# Patient Record
Sex: FEMALE | Race: WHITE | Employment: UNEMPLOYED | URBAN - METROPOLITAN AREA
[De-identification: names, ages, dates, MRNs, and addresses within clinical notes are randomized per-mention and may not be internally consistent; named-entity substitution may affect disease eponyms.]

---

## 2017-02-27 ENCOUNTER — OFFICE VISIT (OUTPATIENT)
Dept: FAMILY MEDICINE CLINIC | Age: 57
End: 2017-02-27

## 2017-02-27 VITALS
DIASTOLIC BLOOD PRESSURE: 81 MMHG | TEMPERATURE: 97.7 F | OXYGEN SATURATION: 100 % | HEIGHT: 62 IN | RESPIRATION RATE: 14 BRPM | HEART RATE: 61 BPM | SYSTOLIC BLOOD PRESSURE: 123 MMHG | BODY MASS INDEX: 17.3 KG/M2 | WEIGHT: 94 LBS

## 2017-02-27 DIAGNOSIS — H69.83 ETD (EUSTACHIAN TUBE DYSFUNCTION), BILATERAL: Primary | ICD-10-CM

## 2017-02-27 DIAGNOSIS — Z12.11 COLON CANCER SCREENING: ICD-10-CM

## 2017-02-27 RX ORDER — FLUTICASONE PROPIONATE 50 MCG
1 SPRAY, SUSPENSION (ML) NASAL DAILY
Qty: 1 BOTTLE | Refills: 0 | Status: SHIPPED | COMMUNITY
Start: 2017-02-27 | End: 2017-03-08 | Stop reason: ALTCHOICE

## 2017-02-27 RX ORDER — LORATADINE 10 MG/1
10 TABLET ORAL DAILY
Qty: 30 TAB | Refills: 11 | Status: SHIPPED | OUTPATIENT
Start: 2017-02-27 | End: 2019-05-30

## 2017-02-27 NOTE — PROGRESS NOTES
Discharge instructions reviewed with patient    Medication list and understanding of medications reviewed with patient. OTC and herbal medications reviewed and added to med list if applicable  Barriers to adherence assessed. Guidance given regarding new medications this visit, including reason for taking this medicine, and common side effects  Given FIT test with instructions. Thank you for returning your application for medication assistance. We will fax your application to the [facility] prescription , Vivia Cranker. It may take anywhere from 3 to 6 weeks for your application to be approved. THE FIRST FILL OF YOUR MEDICINE WILL BE DELIVERED TO THE [facility]. CALL [phone] OR [phone]   TO ASK FOR REFILLS WHEN YOU HAVE ONE MONTH   OF MEDICINE LEFT. Think of it the same way as when you call your regular pharmacy to order your medicine refills.   For [place]

## 2017-02-27 NOTE — PROGRESS NOTES
No chief complaint on file. 1. Have you been to the ER, urgent care clinic since your last visit? Hospitalized since your last visit? No    2. Have you seen or consulted any other health care providers outside of the [address] [place] [address] since your last visit? No    3. When was your last Mammogram, Pap smear, and/or Colon screening? Mammogram: Year: 2016  Pap smear: year: 2016 Colonoscopy: Year:No history of colon screening. PMH/FH/Social Hx reviewed and updated as needed      Applicable screenings reviewed and updated as needed  Medication reconciliation performed. Patient does not know need medication refills. Health Maintenance reviewed.

## 2017-02-27 NOTE — PROGRESS NOTES
GENESIS Jurado is a 49 y.o. female being seen today for   Chief Complaint   Patient presents with    Follow-up     right ear deafness- new symptoms left ear  - 2 days of pain followed by difficulty hearing   . she states that she continues to have trouble with right ear decreased hearing and pressure. Some on left side more recently. She has been taking the claritin most days. Still has some of the flonase sample she was given 6 months ago. Did try sudafed, but not with the clariitin. Has clear nasal drainage and sinus pressure. Also quit smoking but that did not help ears. Past Medical History:   Diagnosis Date    History of conization of cervix          ROS  Patient states that she is feeling well. Denies complaints of chest pain, shortness of breath, swelling of legs, dizziness or weakness. she denies nausea, vomiting or diarrhea. Current Outpatient Prescriptions   Medication Sig    fluticasone (FLONASE) 50 mcg/actuation nasal spray 1 North by Both Nostrils route daily.  loratadine (CLARITIN) 10 mg tablet Take 1 Tab by mouth daily.  fluticasone (FLONASE) 50 mcg/actuation nasal spray 1 North by Both Nostrils route daily. No current facility-administered medications for this visit. PE  Visit Vitals    /81 (BP 1 Location: Left arm, BP Patient Position: Sitting)    Pulse 61    Temp 97.7 °F (36.5 °C) (Oral)    Resp 14    Ht 5' 1.5\" (1.562 m)    Wt 94 lb (42.6 kg)    SpO2 100%    BMI 17.47 kg/m2        Alert and oriented with normal mood and affect. she is well developed and well nourished . Lungs are clear without wheezing. Heart rate is regular without murmurs or gallops. There is no lower extremity edema. Bilateral TMs retracted. No erythema. Nasal passages swollen with clear mucus. Assessment and Plan:        ICD-10-CM ICD-9-CM    1. ETD (eustachian tube dysfunction), bilateral H69.83 381.81 REFERRAL TO ENT-OTOLARYNGOLOGY   2.  Colon cancer screening  Reviewed options. Pt chooses FIT test Z12.11 V76.51 OCCULT BLOOD, IMMUNOASSAY (FIT)     Use flonase, claritin, sudafed consistently and all together. flonase sample given. Sx due to allergic rhiniits  Restart referral process for ENT. If she responds favorably to above treatment then she can choose to cancel her ENT appt.     Pap for rubio Atkins MD

## 2017-02-27 NOTE — PROGRESS NOTES
No chief complaint on file. 1. Have you been to the ER, urgent care clinic since your last visit? Hospitalized since your last visit? {Yes when where and reason for visit:20441}    2. Have you seen or consulted any other health care providers outside of the [address] [place] [address] since your last visit? {Yes when where and reason for visit:20441}    3. When was your last Mammogram, Pap smear, and/or Colon screening? Mammogram: Year: *** Pap smear: year: *** Colonoscopy: Year:***  PMH/FH/Social Hx reviewed and updated as needed      Applicable screenings reviewed and updated as needed  Medication reconciliation performed. Patient {DOES_DOES GMY:85176} need medication refills. Health Maintenance reviewed.

## 2017-02-27 NOTE — MR AVS SNAPSHOT
Visit Information Date & Time Provider Department Dept. Phone Encounter #  
 2/27/2017 10:15 AM Susana Bautista MD [address] [place] [address] [id number] Upcoming Health Maintenance Date Due Hepatitis C Screening 1960 Pneumococcal 19-64 Medium Risk (1 of 1 - PPSV23) 9/15/1981 DTaP/Tdap/Td series (1 - Tdap) 9/15/1983 FOBT Q 1 YEAR AGE 50-75 9/15/2012 INFLUENZA AGE 9 TO ADULT 8/1/2016 BREAST CANCER SCRN MAMMOGRAM 3/16/2018 PAP AKA CERVICAL CYTOLOGY 1/4/2019 Allergies as of 2/27/2017  Review Complete On: 2/27/2017 By: Ang Pichardo Severity Noted Reaction Type Reactions Ampicillin  04/11/2012    Nausea Only Iodine  04/25/2016    Not Reported This Time Shellfish Containing Products  05/23/2016    Other (comments) Current Immunizations  Never Reviewed No immunizations on file. Not reviewed this visit You Were Diagnosed With   
  
 Codes Comments ETD (eustachian tube dysfunction), bilateral    -  Primary ICD-10-CM: A26.74 ICD-9-CM: 381.81 Colon cancer screening     ICD-10-CM: Z12.11 ICD-9-CM: V76.51 Vitals BP  
  
  
  
  
  
 123/81 (BP 1 Location: Left arm, BP Patient Position: Sitting) BMI and BSA Data Body Mass Index Body Surface Area  
 17.47 kg/m 2 1.36 m 2 Preferred Pharmacy Pharmacy Name Phone [facility] [address] [place] [address] [phone] Your Updated Medication List  
  
   
This list is accurate as of: 2/27/17 11:13 AM.  Always use your most recent med list.  
  
  
  
  
 fluticasone 50 mcg/actuation nasal spray Commonly known as:  FLONASE  
1 [place] by Both Nostrils route daily. loratadine 10 mg tablet Commonly known as:  Karen Padilla Take 1 Tab by mouth daily. We Performed the Following REFERRAL TO ENT-OTOLARYNGOLOGY [AMX99 Custom] Comments: Please evaluate patient for ear pressure and decreased hearing EVMS To-Do List   
 02/27/2017 Lab:  OCCULT BLOOD, IMMUNOASSAY (FIT) Referral Information Referral ID Referred By Referred To [id number] Destiny Dyer MAEGAN Not Available Visits Status Start Date End Date 1 New Request 2/27/17 2/27/18 If your referral has a status of pending review or denied, additional information will be sent to support the outcome of this decision. Introducing [facility]! Neida Henry introduces [employer] patient portal. Now you can access parts of your medical record, email your doctor's office, and request medication refills online. 1. In your internet browser, go to [email or web address] 2. Click on the First Time User? Click Here link in the Sign In box. You will see the New Member Sign Up page. 3. Enter your [employer] Access Code exactly as it appears below. You will not need to use this code after youve completed the sign-up process. If you do not sign up before the expiration date, you must request a new code. · [employer] Access Code: [email or web address] Expires: 5/28/2017 11:13 AM 
 
4. Enter the last four digits of your Social Security Number (xxxx) and Date of Birth (mm/dd/yyyy) as indicated and click Submit. You will be taken to the next sign-up page. 5. Create a [employer] ID. This will be your [employer] login ID and cannot be changed, so think of one that is secure and easy to remember. 6. Create a [employer] password. You can change your password at any time. 7. Enter your Password Reset Question and Answer. This can be used at a later time if you forget your password. 8. Enter your e-mail address. You will receive e-mail notification when new information is available in [address] 9. Click Sign Up. You can now view and download portions of your medical record.  
10. Click the Download Summary menu link to download a portable copy of your medical information. If you have questions, please visit the Frequently Asked Questions section of the [employer] website. Remember, [employer] is NOT to be used for urgent needs. For medical emergencies, dial 911. Now available from your iPhone and Android! Please provide this summary of care documentation to your next provider. Your primary care clinician is listed as NONE. If you have any questions after today's visit, please call [phone].

## 2017-03-10 ENCOUNTER — HOSPITAL ENCOUNTER (OUTPATIENT)
Dept: LAB | Age: 57
Discharge: HOME | End: 2017-03-10

## 2017-03-10 DIAGNOSIS — Z12.11 COLON CANCER SCREENING: ICD-10-CM

## 2017-03-13 PROCEDURE — 82274 ASSAY TEST FOR BLOOD FECAL: CPT | Performed by: FAMILY MEDICINE

## 2017-03-15 LAB — HEMOCCULT STL QL IA: NEGATIVE

## 2017-09-25 ENCOUNTER — OFFICE VISIT (OUTPATIENT)
Dept: FAMILY MEDICINE CLINIC | Age: 57
End: 2017-09-25

## 2017-09-25 VITALS
TEMPERATURE: 98.2 F | HEART RATE: 90 BPM | HEIGHT: 62 IN | BODY MASS INDEX: 16.01 KG/M2 | DIASTOLIC BLOOD PRESSURE: 81 MMHG | RESPIRATION RATE: 16 BRPM | OXYGEN SATURATION: 94 % | SYSTOLIC BLOOD PRESSURE: 125 MMHG | WEIGHT: 87 LBS

## 2017-09-25 DIAGNOSIS — R10.13 EPIGASTRIC PAIN: Primary | ICD-10-CM

## 2017-09-25 DIAGNOSIS — F41.9 ANXIETY: ICD-10-CM

## 2017-09-25 RX ORDER — DIAZEPAM 5 MG/1
5 TABLET ORAL
Qty: 30 TAB | Refills: 1 | OUTPATIENT
Start: 2017-09-25 | End: 2019-05-30

## 2017-09-25 NOTE — PROGRESS NOTES
HPI  Jennifer Edwards is a 56 y.o. female being seen today for   Chief Complaint   Patient presents with    Follow-up   .  she states that she has upper abdominal pain every day for last 3 weeks. Antacids dont help. Bowel movements are normal.    Has vomited a few times but if she lays down she can keep from vomiting. Pain and nausea worse if she is moving around. This is the same pain she was seen for last year. Ultrasound ordered at that time but she never got it done. Now pain is worse. Also with new anxiety. Her 3 sons all in snf for drug charges. Financial troubles and just stressed about her family. Tearful and has a hard time making ti through the day. Smokes     Past Medical History:   Diagnosis Date    History of conization of cervix          ROS  Patient states that she is feeling well. Denies complaints of chest pain, shortness of breath, swelling of legs, dizziness or weakness. Current Outpatient Prescriptions   Medication Sig    diazePAM (VALIUM) 5 mg tablet Take 1 Tab by mouth every eight (8) hours as needed for Anxiety. Max Daily Amount: 15 mg.    beclomethasone (BECONASE AQ) 42 mcg (0.042 %) nasal spray 1 Puff by Both Nostrils route two (2) times a day.  loratadine (CLARITIN) 10 mg tablet Take 1 Tab by mouth daily. No current facility-administered medications for this visit. PE  Visit Vitals    /81 (BP 1 Location: Left arm, BP Patient Position: Sitting)    Pulse 90    Temp 98.2 °F (36.8 °C) (Oral)    Resp 16    Ht 5' 1.5\" (1.562 m)    Wt 87 lb (39.5 kg)    SpO2 94%    BMI 16.17 kg/m2        Alert and oriented with normal mood and affect. she is well developed and well nourished . Lungs are clear without wheezing. Heart rate is regular without murmurs or gallops. There is no lower extremity edema.   Some tenderness with deep palpation right upper quadrant    Results for orders placed or performed during the hospital encounter of 03/10/17   OCCULT BLOOD, IMMUNOASSAY (FIT)   Result Value Ref Range    Occult blood fecal, by IA NEGATIVE  NEGATIVE           Assessment and Plan:        ICD-10-CM ICD-9-CM    1. Epigastric pain R10.13 789.06 US GALLBLADDER   2. Anxiety F41.9 300.00      Check ultrasound for possible gallbladder disease  To the ED for any worsening    For anxiety will give short term rx for valium. Long term will transition to ssri but defer at this time due to nausea and acute abdominal issues.        Aydee Mitchell MD

## 2017-09-25 NOTE — PROGRESS NOTES
Discharge instructions reviewed with patient    Medication list and understanding of medications reviewed with patient. OTC and herbal medications reviewed and added to med list if applicable  Barriers to adherence assessed. Guidance given regarding new medications this visit, including reason for taking this medicine, and common side effects. Ultrasound scheduled for ABD US at [facility]. for tomorrow, (pt has to be NPO for 8 hours before test, and she ate breakfast.) Coupon for Valium given

## 2017-09-25 NOTE — MR AVS SNAPSHOT
Visit Information Date & Time Provider Department Dept. Phone Encounter #  
 9/25/2017 10:45 AM Bill Hernandez MD [facility] [address] [id number] Upcoming Health Maintenance Date Due Hepatitis C Screening 1960 DTaP/Tdap/Td series (1 - Tdap) 9/15/1983 INFLUENZA AGE 9 TO ADULT 8/1/2017 FOBT Q 1 YEAR AGE 50-75 3/13/2018 PAP AKA CERVICAL CYTOLOGY 1/4/2019 BREAST CANCER SCRN MAMMOGRAM 5/25/2019 Allergies as of 9/25/2017  Review Complete On: 9/25/2017 By: Agnieszka Vo Severity Noted Reaction Type Reactions Ampicillin  04/11/2012    Nausea Only Iodine  04/25/2016    Not Reported This Time Shellfish Containing Products  05/23/2016    Other (comments) Current Immunizations  Never Reviewed No immunizations on file. Not reviewed this visit You Were Diagnosed With   
  
 Codes Comments Epigastric pain    -  Primary ICD-10-CM: R10.13 ICD-9-CM: 789.06 Anxiety     ICD-10-CM: F41.9 ICD-9-CM: 300.00 Vitals BP Pulse Temp Resp Height(growth percentile) Weight(growth percentile) 125/81 (BP 1 Location: Left arm, BP Patient Position: Sitting) 90 98.2 °F (36.8 °C) (Oral) 16 5' 1.5\" (1.562 m) 87 lb (39.5 kg) SpO2 BMI OB Status Smoking Status 94% 16.17 kg/m2 Menopause Former Smoker Vitals History BMI and BSA Data Body Mass Index Body Surface Area  
 16.17 kg/m 2 1.31 m 2 Preferred Pharmacy Pharmacy Name Phone [facility] [address] [place] [address] [phone] Your Updated Medication List  
  
   
This list is accurate as of: 9/25/17 12:27 PM.  Always use your most recent med list.  
  
  
  
  
 beclomethasone 42 mcg (0.042 %) nasal spray Commonly known as:  BECONASE AQ  
1 Puff by Both Nostrils route two (2) times a day. diazePAM 5 mg tablet Commonly known as:  VALIUM  
 Take 1 Tab by mouth every eight (8) hours as needed for Anxiety. Max Daily Amount: 15 mg.  
  
 loratadine 10 mg tablet Commonly known as:  Satnam Pour Take 1 Tab by mouth daily. To-Do List   
 09/25/2017 Imaging:  US GALLBLADDER   
  
 09/26/2017 1:00 PM  
  Appointment with [address] at [facility] ([phone]) OUTSIDE FILMS  - Any outside films related to the study being scheduled should be brought with you on the day of the exam.  If this cannot be done there may be a delay in the reading of the study. NPO -Patient must be NPO (no food or drink) 8 hours prior to the exam.  MEDICATIONS  - Patient must bring a complete list of all medications currently taking to include prescriptions, over-the-counter meds, herbals, vitamins & any dietary supplements Introducing [facility]! [employer] introduces [employer] patient portal. Now you can access parts of your medical record, email your doctor's office, and request medication refills online. 1. In your internet browser, go to [email or web address] 2. Click on the First Time User? Click Here link in the Sign In box. You will see the New Member Sign Up page. 3. Enter your [employer] Access Code exactly as it appears below. You will not need to use this code after youve completed the sign-up process. If you do not sign up before the expiration date, you must request a new code. · [employer] Access Code: [email or web address] Expires: 12/12/2017  5:27 PM 
 
4. Enter the last four digits of your Social Security Number (xxxx) and Date of Birth (mm/dd/yyyy) as indicated and click Submit. You will be taken to the next sign-up page. 5. Create a [employer] ID. This will be your [employer] login ID and cannot be changed, so think of one that is secure and easy to remember. 6. Create a [employer] password. You can change your password at any time. 7. Enter your Password Reset Question and Answer.  This can be used at a later time if you forget your password. 8. Enter your e-mail address. You will receive e-mail notification when new information is available in [address] 9. Click Sign Up. You can now view and download portions of your medical record. 10. Click the Download Summary menu link to download a portable copy of your medical information. If you have questions, please visit the Frequently Asked Questions section of the [employer] website. Remember, [employer] is NOT to be used for urgent needs. For medical emergencies, dial 911. Now available from your iPhone and Android! Please provide this summary of care documentation to your next provider. Your primary care clinician is listed as [address]. If you have any questions after today's visit, please call [phone].

## 2017-09-26 ENCOUNTER — HOSPITAL ENCOUNTER (OUTPATIENT)
Dept: ULTRASOUND IMAGING | Age: 57
Discharge: HOME | End: 2017-09-26
Attending: FAMILY MEDICINE
Payer: SELF-PAY

## 2017-09-26 DIAGNOSIS — R10.13 EPIGASTRIC PAIN: ICD-10-CM

## 2017-09-26 PROCEDURE — 76705 ECHO EXAM OF ABDOMEN: CPT

## 2017-10-27 ENCOUNTER — TELEPHONE (OUTPATIENT)
Dept: FAMILY MEDICINE CLINIC | Age: 57
End: 2017-10-27

## 2017-10-27 NOTE — TELEPHONE ENCOUNTER
----- Message from Randall Devine MD sent at 9/28/2017  9:38 AM EDT -----  Can you call this nice pt and let her know her ultrasound was normal but she needs a follo wup appt with us to do more testing    Thank you  kvng  ----- Message -----     From: Regino Almendarez Results In     Sent: 9/27/2017   9:19 AM       To: Randall Devine MD        Appt scheduled.

## 2017-11-27 ENCOUNTER — HOSPITAL ENCOUNTER (OUTPATIENT)
Dept: LAB | Age: 57
Discharge: HOME | End: 2017-11-27

## 2017-11-27 ENCOUNTER — OFFICE VISIT (OUTPATIENT)
Dept: FAMILY MEDICINE CLINIC | Age: 57
End: 2017-11-27

## 2017-11-27 VITALS
OXYGEN SATURATION: 99 % | DIASTOLIC BLOOD PRESSURE: 80 MMHG | WEIGHT: 87 LBS | SYSTOLIC BLOOD PRESSURE: 118 MMHG | TEMPERATURE: 98.2 F | RESPIRATION RATE: 16 BRPM | HEART RATE: 80 BPM | BODY MASS INDEX: 16.01 KG/M2 | HEIGHT: 62 IN

## 2017-11-27 DIAGNOSIS — R10.13 EPIGASTRIC PAIN: ICD-10-CM

## 2017-11-27 DIAGNOSIS — Z23 ENCOUNTER FOR IMMUNIZATION: ICD-10-CM

## 2017-11-27 DIAGNOSIS — R10.13 EPIGASTRIC PAIN: Primary | ICD-10-CM

## 2017-11-27 DIAGNOSIS — F41.9 ANXIETY: ICD-10-CM

## 2017-11-27 LAB
ALBUMIN SERPL-MCNC: 3.8 G/DL (ref 3.4–5)
ALBUMIN/GLOB SERPL: 1.2 {RATIO} (ref 0.8–1.7)
ALP SERPL-CCNC: 71 U/L (ref 45–117)
ALT SERPL-CCNC: 27 U/L (ref 13–56)
ANION GAP SERPL CALC-SCNC: 6 MMOL/L (ref 3–18)
AST SERPL-CCNC: 20 U/L (ref 15–37)
BASOPHILS # BLD: 0 K/UL (ref 0–0.06)
BASOPHILS NFR BLD: 0 % (ref 0–2)
BILIRUB SERPL-MCNC: 0.3 MG/DL (ref 0.2–1)
BUN SERPL-MCNC: 10 MG/DL (ref 7–18)
BUN/CREAT SERPL: 16 (ref 12–20)
CALCIUM SERPL-MCNC: 9.3 MG/DL (ref 8.5–10.1)
CHLORIDE SERPL-SCNC: 107 MMOL/L (ref 100–108)
CO2 SERPL-SCNC: 28 MMOL/L (ref 21–32)
CREAT SERPL-MCNC: 0.63 MG/DL (ref 0.6–1.3)
DIFFERENTIAL METHOD BLD: NORMAL
EOSINOPHIL # BLD: 0.1 K/UL (ref 0–0.4)
EOSINOPHIL NFR BLD: 1 % (ref 0–5)
ERYTHROCYTE [DISTWIDTH] IN BLOOD BY AUTOMATED COUNT: 13 % (ref 11.6–14.5)
GLOBULIN SER CALC-MCNC: 3.2 G/DL (ref 2–4)
GLUCOSE SERPL-MCNC: 90 MG/DL (ref 74–99)
HCT VFR BLD AUTO: 42.8 % (ref 35–45)
HGB BLD-MCNC: 14.6 G/DL (ref 12–16)
LIPASE SERPL-CCNC: 577 U/L (ref 73–393)
LYMPHOCYTES # BLD: 3.2 K/UL (ref 0.9–3.6)
LYMPHOCYTES NFR BLD: 43 % (ref 21–52)
MCH RBC QN AUTO: 30.2 PG (ref 24–34)
MCHC RBC AUTO-ENTMCNC: 34.1 G/DL (ref 31–37)
MCV RBC AUTO: 88.6 FL (ref 74–97)
MONOCYTES # BLD: 0.5 K/UL (ref 0.05–1.2)
MONOCYTES NFR BLD: 7 % (ref 3–10)
NEUTS SEG # BLD: 3.6 K/UL (ref 1.8–8)
NEUTS SEG NFR BLD: 49 % (ref 40–73)
PLATELET # BLD AUTO: 225 K/UL (ref 135–420)
PMV BLD AUTO: 9.9 FL (ref 9.2–11.8)
POTASSIUM SERPL-SCNC: 4.1 MMOL/L (ref 3.5–5.5)
PROT SERPL-MCNC: 7 G/DL (ref 6.4–8.2)
RBC # BLD AUTO: 4.83 M/UL (ref 4.2–5.3)
SODIUM SERPL-SCNC: 141 MMOL/L (ref 136–145)
WBC # BLD AUTO: 7.4 K/UL (ref 4.6–13.2)

## 2017-11-27 PROCEDURE — 80053 COMPREHEN METABOLIC PANEL: CPT | Performed by: FAMILY MEDICINE

## 2017-11-27 PROCEDURE — 83690 ASSAY OF LIPASE: CPT | Performed by: FAMILY MEDICINE

## 2017-11-27 PROCEDURE — 85025 COMPLETE CBC W/AUTO DIFF WBC: CPT | Performed by: FAMILY MEDICINE

## 2017-11-27 PROCEDURE — 86677 HELICOBACTER PYLORI ANTIBODY: CPT | Performed by: FAMILY MEDICINE

## 2017-11-27 NOTE — PROGRESS NOTES
Renetta Holcomb is a 56 y.o. female who presents for routine immunizations. She denies any symptoms , reactions or allergies that would exclude them from being immunized today. Risks and adverse reactions were discussed and the VIS was given to them. All questions were addressed. She was observed for 15 min post injection. There were no reactions observed. Sun Godinez RN      Discharge instructions reviewed with patient    Medication list and understanding of medications reviewed with patient. OTC and herbal medications reviewed and added to med list if applicable  Barriers to adherence assessed. Guidance given regarding new medications this visit, including reason for taking this medicine, and common side effects.     Unable to print AVS.

## 2017-11-28 ENCOUNTER — HOSPITAL ENCOUNTER (OUTPATIENT)
Dept: LAB | Age: 57
Discharge: HOME | End: 2017-11-28
Payer: SELF-PAY

## 2017-11-28 DIAGNOSIS — R10.13 EPIGASTRIC PAIN: ICD-10-CM

## 2017-11-28 LAB
ATRIAL RATE: 89 BPM
CALCULATED P AXIS, ECG09: 83 DEGREES
CALCULATED R AXIS, ECG10: 94 DEGREES
CALCULATED T AXIS, ECG11: 77 DEGREES
DIAGNOSIS, 93000: NORMAL
P-R INTERVAL, ECG05: 154 MS
Q-T INTERVAL, ECG07: 328 MS
QRS DURATION, ECG06: 70 MS
QTC CALCULATION (BEZET), ECG08: 399 MS
VENTRICULAR RATE, ECG03: 89 BPM

## 2017-11-28 PROCEDURE — 93005 ELECTROCARDIOGRAM TRACING: CPT

## 2017-11-29 LAB — H PYLORI IGA SER-ACNC: 14.9 UNITS (ref 0–8.9)

## 2017-11-29 NOTE — PROGRESS NOTES
HPI  Zenaida Alcantar is a 56 y.o. female being seen today for   Chief Complaint   Patient presents with    Abdominal Pain     followup   .  she states that the abdominal pain comes and goes. Overall is the same. Worse with eating and she is losing weight. Feels her heart is pounding all the time. The diazepam is helping her anxiety. She uses it sparingly and still on her first bottle. Abdominal ultrasound showed some possible gallbladder sludge but no real cause for her abdominal pain    Past Medical History:   Diagnosis Date    History of conization of cervix          ROS  Patient states that she is feeling well. Denies complaints of chest pain, shortness of breath, swelling of legs, dizziness or weakness. she denies diarrhea. Current Outpatient Prescriptions   Medication Sig    diazePAM (VALIUM) 5 mg tablet Take 1 Tab by mouth every eight (8) hours as needed for Anxiety. Max Daily Amount: 15 mg.    beclomethasone (BECONASE AQ) 42 mcg (0.042 %) nasal spray 1 Puff by Both Nostrils route two (2) times a day.  loratadine (CLARITIN) 10 mg tablet Take 1 Tab by mouth daily. No current facility-administered medications for this visit. PE  Visit Vitals    /80 (BP 1 Location: Left arm, BP Patient Position: Sitting)    Pulse 80    Temp 98.2 °F (36.8 °C) (Oral)    Resp 16    Ht 5' 1.5\" (1.562 m)    Wt 87 lb (39.5 kg)    SpO2 99%    BMI 16.17 kg/m2        Alert and oriented with normal mood and affect. she is well developed and well nourished . Lungs are clear without wheezing. Heart rate is regular without murmurs or gallops. There is no lower extremity edema. Results for orders placed or performed during the hospital encounter of 03/10/17   OCCULT BLOOD, IMMUNOASSAY (FIT)   Result Value Ref Range    Occult blood fecal, by IA NEGATIVE  NEGATIVE           Assessment and Plan:        ICD-10-CM ICD-9-CM    1.  Epigastric pain R10.13 789.06 H PYLORI AB, IGA      LIPASE METABOLIC PANEL, COMPREHENSIVE      CBC WITH AUTOMATED DIFF      EKG, 12 LEAD, INITIAL   2. Anxiety F41.9 300.00    3.  Encounter for immunization Z23 V03.89 INFLUENZA VIRUS VAC QUAD,SPLIT,PRESV FREE SYRINGE IM     Check lipase and h pylori, EKG  Follow up pending labs      Marika Ignacio MD

## 2017-12-06 RX ORDER — AMOXICILLIN 500 MG/1
1000 CAPSULE ORAL 2 TIMES DAILY
Qty: 40 CAP | Refills: 0 | Status: SHIPPED | OUTPATIENT
Start: 2017-12-06 | End: 2017-12-16

## 2017-12-06 RX ORDER — PANTOPRAZOLE SODIUM 40 MG/1
40 TABLET, DELAYED RELEASE ORAL DAILY
Qty: 10 TAB | Refills: 0 | Status: SHIPPED | OUTPATIENT
Start: 2017-12-06 | End: 2017-12-16

## 2017-12-06 RX ORDER — LEVOFLOXACIN 500 MG/1
500 TABLET, FILM COATED ORAL DAILY
Qty: 10 TAB | Refills: 0 | Status: SHIPPED | OUTPATIENT
Start: 2017-12-06 | End: 2017-12-16

## 2018-06-28 ENCOUNTER — TELEPHONE (OUTPATIENT)
Dept: FAMILY MEDICINE CLINIC | Age: 58
End: 2018-06-28

## 2018-06-28 DIAGNOSIS — R42 DIZZINESS: Primary | ICD-10-CM

## 2018-06-28 DIAGNOSIS — H91.91 HEARING LOSS OF RIGHT EAR, UNSPECIFIED HEARING LOSS TYPE: ICD-10-CM

## 2018-06-28 NOTE — TELEPHONE ENCOUNTER
Spoke with patient about completing investigative tests suggested by [facility] were completed

## 2018-07-03 ENCOUNTER — HOSPITAL ENCOUNTER (OUTPATIENT)
Age: 58
Discharge: HOME | End: 2018-07-03
Attending: FAMILY MEDICINE
Payer: SELF-PAY

## 2018-07-03 DIAGNOSIS — H91.91 HEARING LOSS OF RIGHT EAR, UNSPECIFIED HEARING LOSS TYPE: ICD-10-CM

## 2018-07-03 DIAGNOSIS — R42 DIZZINESS: ICD-10-CM

## 2018-07-03 PROCEDURE — 74011250636 HC RX REV CODE- 250/636: Performed by: FAMILY MEDICINE

## 2018-07-03 PROCEDURE — 70553 MRI BRAIN STEM W/O & W/DYE: CPT

## 2018-07-03 PROCEDURE — A9575 INJ GADOTERATE MEGLUMI 0.1ML: HCPCS | Performed by: FAMILY MEDICINE

## 2018-07-03 RX ORDER — GADOTERATE MEGLUMINE 376.9 MG/ML
9 INJECTION INTRAVENOUS
Status: COMPLETED | OUTPATIENT
Start: 2018-07-03 | End: 2018-07-03

## 2018-07-03 RX ADMIN — GADOTERATE MEGLUMINE 9 ML: 376.9 INJECTION INTRAVENOUS at 11:45

## 2018-07-11 ENCOUNTER — TELEPHONE (OUTPATIENT)
Dept: FAMILY MEDICINE CLINIC | Age: 58
End: 2018-07-11

## 2019-05-30 ENCOUNTER — OFFICE VISIT (OUTPATIENT)
Dept: FAMILY MEDICINE CLINIC | Age: 59
End: 2019-05-30

## 2019-05-30 VITALS
DIASTOLIC BLOOD PRESSURE: 71 MMHG | OXYGEN SATURATION: 98 % | BODY MASS INDEX: 16.95 KG/M2 | WEIGHT: 89.8 LBS | HEART RATE: 77 BPM | SYSTOLIC BLOOD PRESSURE: 114 MMHG | RESPIRATION RATE: 20 BRPM | TEMPERATURE: 98.7 F | HEIGHT: 61 IN

## 2019-05-30 DIAGNOSIS — Z12.39 BREAST SCREENING, UNSPECIFIED: ICD-10-CM

## 2019-05-30 DIAGNOSIS — F41.9 ANXIETY: ICD-10-CM

## 2019-05-30 DIAGNOSIS — M06.9 RHEUMATOID ARTHRITIS INVOLVING BOTH HANDS, UNSPECIFIED RHEUMATOID FACTOR PRESENCE: Primary | ICD-10-CM

## 2019-05-30 RX ORDER — BUSPIRONE HYDROCHLORIDE 15 MG/1
7.5 TABLET ORAL 2 TIMES DAILY
Qty: 30 TAB | Refills: 3 | Status: SHIPPED | OUTPATIENT
Start: 2019-05-30 | End: 2020-08-21 | Stop reason: ALTCHOICE

## 2019-05-30 NOTE — PATIENT INSTRUCTIONS

## 2019-05-30 NOTE — PROGRESS NOTES
Patient is here for generalized pain and fatigue        1. Have you been to the ER, urgent care clinic since your last visit? Hospitalized since your last visit? No    2. Have you seen or consulted any other health care providers outside of the [address] [place] [address] since your last visit? Include any pap smears or colon screening.  CATIE

## 2019-05-30 NOTE — PROGRESS NOTES
HISTORY OF PRESENT ILLNESS  Christy Martinez is a 66 y.o. female. HPI  Patient is here today for evaluation and treatment of: Fatique, Generalized Pain,     Fatigue/Generalized pain - Pt is new to the practice. She was diagnosed with RA about 10 years go in [address]. She is on disability but she is  unsure what the formal diagnosis is  Has a chronic ringing in her ears. She has not prescribed meds for RA. She may take Ibuprofen at times . She has anxiety; Anxiety:  Has been managing this well. She is willing to try another non addicting med med anxiety. She has been on benzodiazepines in past.     She needs a mammogram      Current Outpatient Medications:     busPIRone (BUSPAR) 15 mg tablet, Take 0.5 Tabs by mouth two (2) times a day., Disp: 30 Tab, Rfl: 3       PMH,  Meds, Allergies, Family History, Social history reviewed      Review of Systems   Constitutional: Negative for chills and fever. Respiratory: Negative for shortness of breath and wheezing. Cardiovascular: Negative for chest pain and palpitations. Musculoskeletal: Positive for joint pain. Negative for falls. Physical Exam   Constitutional: She appears well-developed and well-nourished. No distress. HENT:   Right Ear: External ear normal.   Cardiovascular: Normal rate and regular rhythm. Exam reveals no gallop. No murmur heard. Pulmonary/Chest: Breath sounds normal. No respiratory distress. She has no wheezes. Visit Vitals  /71 (BP 1 Location: Left arm, BP Patient Position: Sitting)   Pulse 77   Temp 98.7 °F (37.1 °C) (Oral)   Resp 20   Ht 5' 1\" (1.549 m)   Wt 89 lb 12.8 oz (40.7 kg)   SpO2 98%   BMI 16.97 kg/m²         ASSESSMENT and PLAN    ICD-10-CM ICD-9-CM    1. Rheumatoid arthritis involving both hands, unspecified rheumatoid factor presence (HCC) M06.9 714.0 REFERRAL TO RHEUMATOLOGY   2. Anxiety F41.9 300.00 busPIRone (BUSPAR) 15 mg tablet   3.  Breast screening, unspecified Z12.31 V76.10 SUKH MAMMO BI SCREENING INCL CAD       As above   treatment plan as listed below  Orders Placed This Encounter    SUKH MAMMO BI SCREENING INCL CAD    REFERRAL TO RHEUMATOLOGY    busPIRone (BUSPAR) 15 mg tablet     Follow-up and Dispositions    · Return in about 8 weeks (around 7/25/2019) for RA; anxiety. An After Visit Summary was printed and given to the patient. This has been fully explained to the patient, who indicates understanding.

## 2019-08-27 ENCOUNTER — HOSPITAL ENCOUNTER (OUTPATIENT)
Dept: MAMMOGRAPHY | Age: 59
Discharge: HOME | End: 2019-08-27
Attending: FAMILY MEDICINE
Payer: MEDICAID

## 2019-08-27 DIAGNOSIS — Z12.39 BREAST SCREENING, UNSPECIFIED: ICD-10-CM

## 2019-08-27 PROCEDURE — 77067 SCR MAMMO BI INCL CAD: CPT

## 2019-09-25 ENCOUNTER — OFFICE VISIT (OUTPATIENT)
Dept: FAMILY MEDICINE CLINIC | Age: 59
End: 2019-09-25

## 2019-09-25 ENCOUNTER — TELEPHONE (OUTPATIENT)
Dept: FAMILY MEDICINE CLINIC | Age: 59
End: 2019-09-25

## 2019-09-25 VITALS
TEMPERATURE: 98.2 F | BODY MASS INDEX: 16.39 KG/M2 | DIASTOLIC BLOOD PRESSURE: 68 MMHG | HEART RATE: 85 BPM | WEIGHT: 86.8 LBS | RESPIRATION RATE: 20 BRPM | OXYGEN SATURATION: 97 % | HEIGHT: 61 IN | SYSTOLIC BLOOD PRESSURE: 110 MMHG

## 2019-09-25 DIAGNOSIS — R23.3 EASY BRUISING: ICD-10-CM

## 2019-09-25 DIAGNOSIS — R10.13 EPIGASTRIC PAIN: ICD-10-CM

## 2019-09-25 DIAGNOSIS — M67.431 GANGLION OF RIGHT WRIST: ICD-10-CM

## 2019-09-25 DIAGNOSIS — M06.9 RHEUMATOID ARTHRITIS INVOLVING BOTH HANDS, UNSPECIFIED RHEUMATOID FACTOR PRESENCE: Primary | ICD-10-CM

## 2019-09-25 DIAGNOSIS — K21.9 GASTROESOPHAGEAL REFLUX DISEASE WITHOUT ESOPHAGITIS: ICD-10-CM

## 2019-09-25 DIAGNOSIS — J20.8 ACUTE BRONCHITIS DUE TO OTHER SPECIFIED ORGANISMS: ICD-10-CM

## 2019-09-25 DIAGNOSIS — F41.9 ANXIETY: ICD-10-CM

## 2019-09-25 RX ORDER — AZITHROMYCIN 250 MG/1
TABLET, FILM COATED ORAL
Qty: 6 TAB | Refills: 0 | Status: SHIPPED | OUTPATIENT
Start: 2019-09-25 | End: 2020-08-21 | Stop reason: ALTCHOICE

## 2019-09-25 NOTE — PROGRESS NOTES
HISTORY OF PRESENT ILLNESS  Rosario Segura is a 64 y.o. female. HPI  Patient is here today for evaluation and treatment of: Anxiety/Productive Cough With Malodorous Sputum/ Skin Concerns:     Anxiety:  She was placed on buspar for anxiety at her last visit. Productive Cough With Malodorous Sputum-  She thinks she has bronchitis. Cough has been productive for a week. She smokes. Skin Concerns;  Developed a cyst on her right wrist.  Has had \" cysts \" before. Requests an exam  States her skin is bleeding easily with minimal injury pattern  States that she has midepigastric abdominal pain. She has had H. Pylori before. She had triple therapy . No pain with eating. She has sx that food gets stuck at times; May also happen with liquid. She had a GI work up 2 years ago for the same sx before the H. Pylori was diagnosed. Has a h/o RA. States her insurance states she can go see Dr. Anne Norwood. Wt Readings from Last 3 Encounters:   09/25/19 86 lb 12.8 oz (39.4 kg)   05/30/19 89 lb 12.8 oz (40.7 kg)   07/03/18 96 lb 6 oz (43.7 kg)           Current Outpatient Medications:     busPIRone (BUSPAR) 15 mg tablet, Take 0.5 Tabs by mouth two (2) times a day., Disp: 30 Tab, Rfl: 3      Review of Systems   Constitutional: Negative for chills and fever. Respiratory: Positive for sputum production. Negative for shortness of breath. Cardiovascular: Negative for chest pain and palpitations.        Physical Exam   Visit Vitals  /68 (BP 1 Location: Left arm, BP Patient Position: Sitting)   Pulse 85   Temp 98.2 °F (36.8 °C) (Oral)   Resp 20   Ht 5' 1\" (1.549 m)   Wt 86 lb 12.8 oz (39.4 kg)   SpO2 97%   BMI 16.40 kg/m²     General appearance: alert, cooperative, no distress; tearful but consolable, appears stated age  Neck: supple, symmetrical, trachea midline, no adenopathy, thyroid: not enlarged, symmetric, no tenderness/mass/nodules, no carotid bruit and no JVD  Lungs: clear to auscultation bilaterally  Heart: regular rate and rhythm, S1, S2 normal, no murmur, click, rub or gallop  Abdomen: soft,;  Bowel sounds normal. No masses,  no organomegaly; + TTP in midepigastrium  Right lateral wrist with a 1cm ganglion cystic lesion. Mobile; mildly tender. Skin;  Some mild bruising left dorsal wrist and right dorsal wrist.   Extremities: extremities normal, atraumatic, no cyanosis or edema  Skin: right dorsal foot with pinpoint papule; ? Wart ;  Similar smaller nodescript lesion on the left dorsal foot. ASSESSMENT and PLAN    ICD-10-CM ICD-9-CM    1. Rheumatoid arthritis involving both hands, unspecified rheumatoid factor presence (HCC) M06.9 714.0 REFERRAL TO RHEUMATOLOGY      METABOLIC PANEL, BASIC      CBC WITH AUTOMATED DIFF      TSH 3RD GENERATION      TSH 3RD GENERATION      CBC WITH AUTOMATED DIFF      METABOLIC PANEL, BASIC   2. Ganglion of right wrist- new M67.431 727.41    3. Easy bruising- new R23.8 782.9    4. Acute bronchitis due to other specified organisms- new J20.8 466.0    5. Gastroesophageal reflux disease without esophagitis- new K21.9 530.81 H PYLORI AB, IGM      H PYLORI AB, IGM   6. Epigastric pain- recurrent R10.13 789.06 H PYLORI AB, IGM      H PYLORI AB, IGM   7. Anxiety- fair but worse due to situational stressors F41.9 300.00        As above   treatment plan as listed below  Orders Placed This Encounter    METABOLIC PANEL, BASIC    CBC WITH AUTOMATED DIFF    TSH 3RD GENERATION    H PYLORI AB, IGM    REFERRAL TO RHEUMATOLOGY    azithromycin (ZITHROMAX Z-GREGORIA) 250 mg tablet     Follow-up and Dispositions    · Return in about 6 weeks (around 11/6/2019), or bronchitis/gerd. An After Visit Summary was printed and given to the patient.   Pt consolable; supported  Monitor

## 2019-09-25 NOTE — PROGRESS NOTES
Patient here for Anxiety/Productive Cough With Malodorous Sputum/skin problem    Follow up. 1. Have you been to the ER, urgent care clinic since your last visit? Hospitalized since your last visit? No    2. Have you seen or consulted any other health care providers outside of the [address] [place] [address] since your last visit? Include any pap smears or colon screening.  No

## 2019-09-25 NOTE — TELEPHONE ENCOUNTER
Pt is looking to follow up with a rheumatologist her care coordinator called Mary Mckeon stated her insurance will cover her to go see Dr. Leonor Lux or Bill Saldana please advise [phone] if any questions

## 2019-09-25 NOTE — PATIENT INSTRUCTIONS
Indigestion (Dyspepsia or Heartburn): Care Instructions  Your Care Instructions  Sometimes it can be hard to pinpoint the cause of indigestion. (It is also called dyspepsia or heartburn.) Most cases of an upset stomach with bloating, burning, burping, and nausea are minor and go away within several hours. Home treatment and over-the-counter medicine often are able to control symptoms. But if you take medicine to relieve your indigestion without making diet and lifestyle changes, your symptoms are likely to return again and again. If you get indigestion often, it may be a sign of a more serious medical problem. Be sure to follow up with your doctor, who may want to do tests to be sure of the cause of your indigestion. Follow-up care is a key part of your treatment and safety. Be sure to make and go to all appointments, and call your doctor if you are having problems. It's also a good idea to know your test results and keep a list of the medicines you take. How can you care for yourself at home? · Your doctor may recommend over-the-counter medicine. For mild or occasional indigestion, antacids such as Gaviscon, Mylanta, Maalox, or Tums, may help. Be safe with medicines. Be careful when you take over-the-counter antacid medicines. Many of these medicines have aspirin in them. Read the label to make sure that you are not taking more than the recommended dose. Too much aspirin can be harmful. · Your doctor also may recommend over-the-counter acid reducers, such as Pepcid AC, Tagamet HB, Zantac 75, or Prilosec. Read and follow all instructions on the label. If you use these medicines often, talk with your doctor. · Change your eating habits. ? It's best to eat several small meals instead of two or three large meals. ? After you eat, wait 2 to 3 hours before you lie down. ? Chocolate, mint, and alcohol can make GERD worse. ?  Spicy foods, foods that have a lot of acid (like tomatoes and oranges), and coffee can make GERD symptoms worse in some people. If your symptoms are worse after you eat a certain food, you may want to stop eating that food to see if your symptoms get better. · Do not smoke or chew tobacco. Smoking can make GERD worse. If you need help quitting, talk to your doctor about stop-smoking programs and medicines. These can increase your chances of quitting for good. · If you have GERD symptoms at night, raise the head of your bed 6 to 8 inches. You can do this by putting the frame on blocks or placing a foam wedge under the head of your mattress. (Adding extra pillows does not work.)  · Do not wear tight clothing around your middle. · Lose weight if you need to. Losing just 5 to 10 pounds can help. · Do not take anti-inflammatory medicines, such as aspirin, ibuprofen (Advil, Motrin), or naproxen (Aleve). These can irritate the stomach. If you need a pain medicine, try acetaminophen (Tylenol), which does not cause stomach upset. When should you call for help? Call your doctor now or seek immediate medical care if:    · You have new or worse belly pain.     · You are vomiting.    Watch closely for changes in your health, and be sure to contact your doctor if:    · You have new or worse symptoms of indigestion.     · You have trouble or pain swallowing.     · You are losing weight.     · You do not get better as expected. Where can you learn more? Go to http://madisyn-jennyfer.info/. Enter J552 in the search box to learn more about \"Indigestion (Dyspepsia or Heartburn): Care Instructions. \"  Current as of: November 7, 2018  Content Version: 12.2  © 7916-3323 Healthwise, Incorporated. Care instructions adapted under license by [employer] (which disclaims liability or warranty for this information).  If you have questions about a medical condition or this instruction, always ask your healthcare professional. [address] any warranty or liability for your use of this information.

## 2019-09-26 LAB
ABSOLUTE LYMPHOCYTE COUNT, 10803: 2 K/UL (ref 1–4.8)
ANION GAP SERPL CALC-SCNC: 16 MMOL/L
BASOPHILS # BLD: 0 K/UL (ref 0–0.2)
BASOPHILS NFR BLD: 1 % (ref 0–2)
BUN SERPL-MCNC: 12 MG/DL (ref 6–22)
CALCIUM SERPL-MCNC: 9.3 MG/DL (ref 8.4–10.5)
CHLORIDE SERPL-SCNC: 102 MMOL/L (ref 98–110)
CO2 SERPL-SCNC: 24 MMOL/L (ref 20–32)
CREAT SERPL-MCNC: 0.6 MG/DL (ref 0.5–1.2)
EOSINOPHIL # BLD: 0.1 K/UL (ref 0–0.5)
EOSINOPHIL NFR BLD: 1 % (ref 0–6)
ERYTHROCYTE [DISTWIDTH] IN BLOOD BY AUTOMATED COUNT: 13.3 % (ref 10–15.5)
GFRAA, 66117: >60
GFRNA, 66118: >60
GLUCOSE SERPL-MCNC: 81 MG/DL (ref 70–99)
GRANULOCYTES,GRANS: 55 % (ref 40–75)
HCT VFR BLD AUTO: 45.1 % (ref 35.1–48)
HGB BLD-MCNC: 14.8 G/DL (ref 11.7–16)
LYMPHOCYTES, LYMLT: 35 % (ref 20–45)
MCH RBC QN AUTO: 30 PG (ref 26–34)
MCHC RBC AUTO-ENTMCNC: 33 G/DL (ref 31–36)
MCV RBC AUTO: 92 FL (ref 80–95)
MONOCYTES # BLD: 0.5 K/UL (ref 0.1–1)
MONOCYTES NFR BLD: 8 % (ref 3–12)
NEUTROPHILS # BLD AUTO: 3.2 K/UL (ref 1.8–7.7)
PLATELET # BLD AUTO: 184 K/UL (ref 140–440)
PMV BLD AUTO: 9.8 FL (ref 9–13)
POTASSIUM SERPL-SCNC: 4 MMOL/L (ref 3.5–5.5)
RBC # BLD AUTO: 4.92 M/UL (ref 3.8–5.2)
SODIUM SERPL-SCNC: 142 MMOL/L (ref 133–145)
TSH SERPL DL<=0.005 MIU/L-ACNC: 2.13 MCU/ML (ref 0.27–4.2)
WBC # BLD AUTO: 5.8 K/UL (ref 4–11)

## 2019-09-27 LAB — H PYLORI IGM SER-ACNC: 23.1 UNITS (ref 0–8.9)

## 2019-10-01 NOTE — PROGRESS NOTES
Letter sent from [facility].

## 2019-10-09 ENCOUNTER — TELEPHONE (OUTPATIENT)
Dept: FAMILY MEDICINE CLINIC | Age: 59
End: 2019-10-09

## 2019-10-30 ENCOUNTER — OFFICE VISIT (OUTPATIENT)
Dept: FAMILY MEDICINE CLINIC | Age: 59
End: 2019-10-30

## 2019-10-30 VITALS
SYSTOLIC BLOOD PRESSURE: 122 MMHG | TEMPERATURE: 98.2 F | HEART RATE: 69 BPM | DIASTOLIC BLOOD PRESSURE: 80 MMHG | OXYGEN SATURATION: 99 % | BODY MASS INDEX: 16.39 KG/M2 | WEIGHT: 86.8 LBS | HEIGHT: 61 IN | RESPIRATION RATE: 18 BRPM

## 2019-10-30 DIAGNOSIS — R10.13 EPIGASTRIC PAIN: ICD-10-CM

## 2019-10-30 DIAGNOSIS — A04.8 H. PYLORI INFECTION: Primary | ICD-10-CM

## 2019-10-30 RX ORDER — LANSOPRAZOLE 30 MG/1
30 CAPSULE, DELAYED RELEASE ORAL 2 TIMES DAILY
Qty: 28 CAP | Refills: 0 | Status: SHIPPED | OUTPATIENT
Start: 2019-10-30 | End: 2020-08-21 | Stop reason: ALTCHOICE

## 2019-10-30 RX ORDER — TETRACYCLINE HYDROCHLORIDE 500 MG/1
500 CAPSULE ORAL
Qty: 56 CAP | Refills: 0 | Status: SHIPPED | OUTPATIENT
Start: 2019-10-30 | End: 2019-11-13

## 2019-10-30 RX ORDER — METRONIDAZOLE 250 MG/1
250 TABLET ORAL 3 TIMES DAILY
Qty: 42 TAB | Refills: 0 | Status: SHIPPED | OUTPATIENT
Start: 2019-10-30 | End: 2019-11-13

## 2019-10-30 NOTE — PROGRESS NOTES
HISTORY OF PRESENT ILLNESS  Shayy Lucio is a 64 y.o. female. HPI  Patient is here today for evaluation and treatment of: Patient is here to review lab results for H pylori  Pt has had H. Pylori infection before and was treated. She has since developed midepigastric pain and had testing for H. Pylori once again. Her H. Pylori lab is + and she is here to discuss treatment options. She still continues to have midepigastric pain. She is underweight. Wt Readings from Last 3 Encounters:   10/30/19 86 lb 12.8 oz (39.4 kg)   09/25/19 86 lb 12.8 oz (39.4 kg)   05/30/19 89 lb 12.8 oz (40.7 kg)      She has continued situational stressors. She continues to take buspar; med does help. Current Outpatient Medications:     busPIRone (BUSPAR) 15 mg tablet, Take 0.5 Tabs by mouth two (2) times a day., Disp: 30 Tab, Rfl: 3    azithromycin (ZITHROMAX Z-GREGORIA) 250 mg tablet, Take 2 tabs PO on Day #1 and then 1 tab PO on Days #2-5, Disp: 6 Tab, Rfl: 0    Review of Systems   Constitutional: Negative for chills and fever. Cardiovascular: Negative for leg swelling. Gastrointestinal: Negative for diarrhea and vomiting. Psychiatric/Behavioral: Negative for suicidal ideas. The patient is nervous/anxious. Physical Exam   Constitutional: She appears well-developed. No distress. thin   Cardiovascular: Normal rate and regular rhythm. Exam reveals no gallop and no friction rub. No murmur heard. Pulmonary/Chest: Breath sounds normal. She has no wheezes. She has no rales. Abdominal: Bowel sounds are normal. She exhibits no distension and no mass. There is tenderness (in mid epigastrium). There is no rebound and no guarding. Musculoskeletal: She exhibits no edema. Nursing note and vitals reviewed.      Visit Vitals  /80 (BP 1 Location: Left arm, BP Patient Position: Sitting)   Pulse 69   Temp 98.2 °F (36.8 °C) (Oral)   Resp 18   Ht 5' 1\" (1.549 m)   Wt 86 lb 12.8 oz (39.4 kg)   SpO2 99%   BMI 16.40 kg/m² Tearful, consolable    H. Pylori result noted. Lab Results   Component Value Date/Time    Sodium 142 09/25/2019 12:35 PM    Potassium 4.0 09/25/2019 12:35 PM    Chloride 102 09/25/2019 12:35 PM    CO2 24 09/25/2019 12:35 PM    Anion gap 16.0 09/25/2019 12:35 PM    Glucose 81 09/25/2019 12:35 PM    BUN 12 09/25/2019 12:35 PM    Creatinine 0.6 09/25/2019 12:35 PM    BUN/Creatinine ratio 16 11/27/2017 10:59 AM    GFR est AA >60 11/27/2017 10:59 AM    GFR est non-AA >60 11/27/2017 10:59 AM    Calcium 9.3 09/25/2019 12:35 PM     Lab Results   Component Value Date/Time    WBC 5.8 09/25/2019 12:35 PM    HGB 14.8 09/25/2019 12:35 PM    HCT 45.1 09/25/2019 12:35 PM    PLATELET 443 19/89/0022 12:35 PM    MCV 92 09/25/2019 12:35 PM         ASSESSMENT and PLAN    ICD-10-CM ICD-9-CM    1. H. pylori infection A04.8 041.86 REFERRAL TO GASTROENTEROLOGY   2. Epigastric pain R10.13 789.06 REFERRAL TO GASTROENTEROLOGY       As above, new   treatment plan as listed below  Orders Placed This Encounter    REFERRAL TO GASTROENTEROLOGY    tetracycline (ACHROMYCIN, SUMYCIN) 500 mg capsule    metroNIDAZOLE (FLAGYL) 250 mg tablet    lansoprazole (PREVACID) 30 mg capsule    bismuth subsalicylate (PEPTO-BISMOL) 262 mg tab     Quad therapy as above  GI referral  Pt supported  Follow-up and Dispositions    · Return in about 3 months (around 1/30/2020). An After Visit Summary was printed and given to the patient. This has been fully explained to the patient, who indicates understanding.

## 2019-10-30 NOTE — PATIENT INSTRUCTIONS
H. Pylori Bacterial Infection in Children: Care Instructions  Your Care Instructions    Your child's test shows the presence of Helicobacter pylori ( H. pylori), a kind of bacterium that lives in the lining of the stomach. Many people have H. pylori in their stomachs and do not develop problems. But sometimes H. pylori causes an upset stomach or a sore (ulcer) in the stomach lining. Most stomach ulcers are caused by H. pylori. Symptoms of an ulcer include gnawing or burning pain in the belly that can last minutes or hours. Eating food or taking antacids helps relieve the pain, but the symptoms may come back after a while. Antibiotic medicine can cure an H. pylori infection. Follow-up care is a key part of your child's treatment and safety. Be sure to make and go to all appointments, and call your doctor if your child is having problems. It's also a good idea to know your child's test results and keep a list of the medicines your child takes. How can you care for your child at home? · If the doctor prescribed antibiotics for your child, give them as directed. Do not stop using them just because your child feels better. Your child needs to take the full course of antibiotics. · If your doctor prescribes other medicine, have your child take it exactly as prescribed. Call your doctor if you think your child is having a problem with his or her medicine. You will get more details on the specific medicine your doctor prescribes. · Help your child eat a healthy, balanced diet. ? Serve smaller meals, and eat more often. Be sure your child eats at least three meals a day. ? Avoid heavily spiced or greasy foods. ? Avoid cola drinks, chocolate, and other foods with caffeine, which may cause an ulcer to hurt more. · Keep your child away from smoke. Do not smoke or let anyone else smoke around your child or in your house. Smoke slows the healing of your child's ulcer and can make an ulcer come back.   · Make sure your child washes his or her hands after going to the bathroom. · Do not give your child ibuprofen, aspirin, or other anti-inflammatory medicines, because they can irritate the stomach. If your child needs pain medicine, try acetaminophen (Tylenol). · Do not give your child two or more pain medicines at the same time unless the doctor told you to. Many pain medicines have acetaminophen, which is Tylenol. Too much acetaminophen (Tylenol) can be harmful. When should you call for help? Call 911 anytime you think your child may need emergency care. For example, call if:    · Your child's stools are maroon or very bloody.     · Your child vomits blood or what looks like coffee grounds.    Call your doctor now or seek immediate medical care if:    · Your child has new or worse belly pain.     · Your child's stools are black and look like tar, or they have streaks of blood.     · Your child is vomiting.    Watch closely for changes in your child's health, and be sure to contact your doctor if:    · Your child does not get better as expected. Where can you learn more? Go to http://madisyn-jennyfer.info/. Enter C401 in the search box to learn more about \"H. Pylori Bacterial Infection in Children: Care Instructions. \"  Current as of: November 7, 2018  Content Version: 12.2  © 8729-5858 Healthwise, Incorporated. Care instructions adapted under license by [employer] (which disclaims liability or warranty for this information). If you have questions about a medical condition or this instruction, always ask your healthcare professional. [place] [address] any warranty or liability for your use of this information.

## 2019-10-30 NOTE — PROGRESS NOTES
Patient here for lab results review. 1. Have you been to the ER, urgent care clinic since your last visit? Hospitalized since your last visit? No    2. Have you seen or consulted any other health care providers outside of the [address] [place] [address] since your last visit? Include any pap smears or colon screening.  No

## 2019-11-12 ENCOUNTER — TELEPHONE (OUTPATIENT)
Dept: FAMILY MEDICINE CLINIC | Age: 59
End: 2019-11-12

## 2019-11-12 NOTE — TELEPHONE ENCOUNTER
Spoke to patient. Dr Estephanie Dunne office attempted to contact patient and schedule her appointment. She did not call the office back. She was given their number to call and set up her appointment. Patient verbalized understanding.

## 2019-11-12 NOTE — TELEPHONE ENCOUNTER
Patient states she received a letter from her insurance that they did no cover her Prevacid and needed to know if she had tried and failed 2 of the following  Omprazole, Protonix. Lomersoporazole. The patient states that she thinks she has been on other medications for her stomach. She is in a lot of pain and she states she is supposed to take these medication together.  Please advise [phone]

## 2019-11-14 ENCOUNTER — TELEPHONE (OUTPATIENT)
Dept: FAMILY MEDICINE CLINIC | Age: 59
End: 2019-11-14

## 2019-11-14 NOTE — TELEPHONE ENCOUNTER
Pt requesting a return call re: exposure to ESBL.  She said this is related to bacteria and ECOLI    She was helping clean someone up recently, took him to the ED and he were diagnosed with ESBL    Please assist

## 2019-11-15 NOTE — TELEPHONE ENCOUNTER
Spoke with patient to gather information. Patient stated that a family member needed assistance and was staying in the patient's home for 3 weeks. Patient stated that patient took family member to the emergency room and family member has been admitted to the hospital. Patient stated that family has been diagnosed with ESBL and that patient's body fluids had been dripping on several surfaces in patient's home. Patient is concerned about contamination at home and spread of ESBL to the patient/patient's family. Informed to contact the nurse at the hospital and request to speak with attending physician for questions about risk to patient, patient's family, and cleaning home properly as to not spread disease. Patient verbalized understanding.

## 2019-11-15 NOTE — TELEPHONE ENCOUNTER
Informed patient that Dr. Dangelo Alcala is aware of possible medication change. In speaking with patient, the patient had not taken the antibiotics. Informed patient to take prescribed tetracycline, metronidazole, and pepto-bimol along with over the counter prevacid. Patient verbalized understanding.

## 2020-08-21 ENCOUNTER — VIRTUAL VISIT (OUTPATIENT)
Dept: FAMILY MEDICINE CLINIC | Age: 60
End: 2020-08-21

## 2020-08-21 DIAGNOSIS — I24.9 ACUTE CORONARY SYNDROME (HCC): Primary | ICD-10-CM

## 2020-08-21 DIAGNOSIS — Z09 HOSPITAL DISCHARGE FOLLOW-UP: ICD-10-CM

## 2020-08-21 RX ORDER — GUAIFENESIN 100 MG/5ML
81 LIQUID (ML) ORAL DAILY
COMMUNITY
Start: 2020-08-14

## 2020-08-21 RX ORDER — FLUTICASONE PROPIONATE 50 MCG
1 SPRAY, SUSPENSION (ML) NASAL DAILY PRN
COMMUNITY

## 2020-08-21 RX ORDER — METOPROLOL TARTRATE 25 MG/1
12.5 TABLET, FILM COATED ORAL 2 TIMES DAILY
COMMUNITY
Start: 2020-08-13

## 2020-08-21 RX ORDER — ATORVASTATIN CALCIUM 80 MG/1
80 TABLET, FILM COATED ORAL
COMMUNITY
Start: 2020-08-13

## 2020-08-21 RX ORDER — CLOPIDOGREL BISULFATE 75 MG/1
75 TABLET ORAL DAILY
COMMUNITY
Start: 2020-08-14

## 2020-08-21 NOTE — PROGRESS NOTES
Cheo Escalona is a 64 y.o. female who was seen by synchronous (real-time) audio-video technology on 8/21/2020 for Hospital Follow Up and [facility] Follow Up:    Cheo Escalona is seen for follow up from recent admission to [facility] on August 9, 2020. I reviewed the lab results, the notes, the records. She presented with Chest Pain/Back Pain (ACS)(NSTEMI). She is taking her medication as directed & without any side effects. She reports symptoms are significantly improved. She has a follow-up with Cardiology on September 15, 2020. Assessment & Plan:   Diagnoses and all orders for this visit:    1. Acute coronary syndrome ([facility] [address]    2. Hospital discharge follow-up      Follow-up and Dispositions    · Return if symptoms worsen or fail to improve. 712  Subjective:       Prior to Admission medications    Medication Sig Start Date End Date Taking? Authorizing Provider   aspirin 81 mg chewable tablet Take 81 mg by mouth daily. 8/14/20  Yes Provider, Historical   atorvastatin (LIPITOR) 80 mg tablet Take 80 mg by mouth nightly. 8/13/20  Yes Provider, Historical   clopidogreL (PLAVIX) 75 mg tab Take 75 mg by mouth daily. 8/14/20  Yes Provider, Historical   fluticasone propionate (FLONASE) 50 mcg/actuation nasal spray 1 Spray daily as needed. Yes Provider, Historical   metoprolol tartrate (LOPRESSOR) 25 mg tablet Take 12.5 mg by mouth two (2) times a day. 8/13/20  Yes Provider, Historical   lansoprazole (PREVACID) 30 mg capsule Take 1 Cap by mouth two (2) times a day. 10/30/19 8/21/20  Roge Alcantar MD   bismuth subsalicylate (PEPTO-BISMOL) 262 mg tab Take 1 Tab by mouth four (4) times daily. 10/30/19 8/21/20  Roge Alcantar MD   azithromycin (ZITHROMAX Z-GREGORIA) 250 mg tablet Take 2 tabs PO on Day #1 and then 1 tab PO on Days #2-5 9/25/19 8/21/20  Roge Alcantar MD   busPIRone (BUSPAR) 15 mg tablet Take 0.5 Tabs by mouth two (2) times a day.  5/30/19 8/21/20 Honorio Buckley MD     Patient Active Problem List   Diagnosis Code    ETD (eustachian tube dysfunction) H69.80    Epigastric pain R10.13    Smoker F17.200    Hypercholesterolemia E78.00    Abnormal Pap smear of cervix R87.619     Current Outpatient Medications   Medication Sig Dispense Refill    aspirin 81 mg chewable tablet Take 81 mg by mouth daily.  atorvastatin (LIPITOR) 80 mg tablet Take 80 mg by mouth nightly.  clopidogreL (PLAVIX) 75 mg tab Take 75 mg by mouth daily.  fluticasone propionate (FLONASE) 50 mcg/actuation nasal spray 1 Spray daily as needed.  metoprolol tartrate (LOPRESSOR) 25 mg tablet Take 12.5 mg by mouth two (2) times a day. Allergies   Allergen Reactions    Ampicillin Nausea Only    Iodine Not Reported This Time    Shellfish Containing Products Other (comments)     Past Medical History:   Diagnosis Date    Abnormal Pap smear of cervix     Anxiety     History of conization of cervix     Hypercholesterolemia        Review of Systems   Respiratory: Negative for shortness of breath. Cardiovascular: Negative for chest pain and palpitations. Objective:   No flowsheet data found. General: alert, cooperative, no distress   Mental  status: normal mood, behavior, speech, dress, motor activity, and thought processes, able to follow commands   HENT: NCAT   Neck: no visualized mass   Resp: no respiratory distress   Neuro: no gross deficits   Skin: no discoloration or lesions of concern on visible areas   Psychiatric: normal affect, consistent with stated mood, no evidence of hallucinations     Additional exam findings: N/A      We discussed the expected course, resolution and complications of the diagnosis(es) in detail. Medication risks, benefits, costs, interactions, and alternatives were discussed as indicated. I advised her to contact the office if her condition worsens, changes or fails to improve as anticipated.  She expressed understanding with the diagnosis(es) and plan. Nicholas Reese, who was evaluated through a patient-initiated, synchronous (real-time) audio-video encounter, and/or her healthcare decision maker, is aware that it is a billable service, with coverage as determined by her insurance carrier. She provided verbal consent to proceed: Yes, and patient identification was verified. It was conducted pursuant to the emergency declaration under the [address] [place] [address] and the [employer] and [employer] Act. A caregiver was present when appropriate. Ability to conduct physical exam was limited. I was in the office. The patient was at home.       Francisco J Worrell NP

## 2020-12-01 NOTE — PROGRESS NOTES
Bar Geiger is a 64 y.o. female who was seen by synchronous (real-time) audio-video technology on 12/2/2020 for URI        Assessment & Plan:   Diagnoses and all orders for this visit:    1. Acute maxillary sinusitis, recurrence not specified    2. Cough      URI/sinusitis with cough  Doxycycline 100 mg bid for 10 days  claritin 10 mg every day for next 30 days  Stop smoking  OV 3 mos or prn  I have explained plan to patient and the patient verbalizes understanding    I spent at least 15 minutes on this visit with this established patient. 712  Subjective:   14 days of head congestion, facial discomfort, NP cough w/o fever, dyspnea, CP, or N  +Tob  Pt states sxs similar to past sinus infxs  CXR NAD 8/2020  Patient denies obvious COVID 19 exposures (close contacts/family members) and again denies associated fever/myalgias      Prior to Admission medications    Medication Sig Start Date End Date Taking? Authorizing Provider   aspirin 81 mg chewable tablet Take 81 mg by mouth daily. 8/14/20   Provider, Historical   atorvastatin (LIPITOR) 80 mg tablet Take 80 mg by mouth nightly. 8/13/20   Provider, Historical   clopidogreL (PLAVIX) 75 mg tab Take 75 mg by mouth daily. 8/14/20   Provider, Historical   fluticasone propionate (FLONASE) 50 mcg/actuation nasal spray 1 Spray daily as needed. Provider, Historical   metoprolol tartrate (LOPRESSOR) 25 mg tablet Take 12.5 mg by mouth two (2) times a day. 8/13/20   Provider, Historical     Current Outpatient Medications   Medication Sig Dispense Refill    aspirin 81 mg chewable tablet Take 81 mg by mouth daily.  atorvastatin (LIPITOR) 80 mg tablet Take 80 mg by mouth nightly.  clopidogreL (PLAVIX) 75 mg tab Take 75 mg by mouth daily.  fluticasone propionate (FLONASE) 50 mcg/actuation nasal spray 1 Spray daily as needed.  metoprolol tartrate (LOPRESSOR) 25 mg tablet Take 12.5 mg by mouth two (2) times a day.        Allergies   Allergen Reactions    Ampicillin Nausea Only    Iodine Not Reported This Time    Shellfish Containing Products Other (comments)     Past Medical History:   Diagnosis Date    Abnormal Pap smear of cervix     Anxiety     History of conization of cervix     Hypercholesterolemia     Ischemic cardiomyopathy 2020    RCA RAYMUNDO; reduced EF     Past Surgical History:   Procedure Laterality Date    HX ADENOIDECTOMY      HX BARTHOLIN CYST MARSUPIALIZATION      HX  SECTION      conization    HX DILATION AND CURETTAGE      HX GYN      Cyst removal x2    HX OTHER SURGICAL      Cyst removal    HX TONSILLECTOMY      HX TUBAL LIGATION      HX TYMPANOSTOMY         ROS per HPI    Objective:   No flowsheet data found. General: alert, cooperative, no distress   Mental  status: normal mood, behavior, speech, dress, motor activity, and thought processes, able to follow commands   HENT: NCAT   Neck: no visualized mass   Resp: no respiratory distress   Neuro: no gross deficits   Skin: no discoloration or lesions of concern on visible areas   Psychiatric: normal affect, consistent with stated mood, no evidence of hallucinations     Additional exam findings:   no    We discussed the expected course, resolution and complications of the diagnosis(es) in detail. Medication risks, benefits, costs, interactions, and alternatives were discussed as indicated. I advised her to contact the office if her condition worsens, changes or fails to improve as anticipated. She expressed understanding with the diagnosis(es) and plan. Nara Rogers, who was evaluated through a patient-initiated, synchronous (real-time) audio-video encounter, and/or her healthcare decision maker, is aware that it is a billable service, with coverage as determined by her insurance carrier. She provided verbal consent to proceed: Yes, and patient identification was verified.  It was conducted pursuant to the emergency declaration under the [address] Emergencies Act, [facility] and the Coronavirus Preparedness and Response Supplemental Appropriations Act. A caregiver was present when appropriate. Ability to conduct physical exam was limited. I was at home. The patient was at home.       Nisreen Clarke MD

## 2020-12-02 ENCOUNTER — VIRTUAL VISIT (OUTPATIENT)
Dept: FAMILY MEDICINE CLINIC | Age: 60
End: 2020-12-02
Payer: MEDICAID

## 2020-12-02 DIAGNOSIS — R05.9 COUGH: ICD-10-CM

## 2020-12-02 DIAGNOSIS — J01.00 ACUTE MAXILLARY SINUSITIS, RECURRENCE NOT SPECIFIED: Primary | ICD-10-CM

## 2020-12-02 PROCEDURE — 99213 OFFICE O/P EST LOW 20 MIN: CPT | Performed by: INTERNAL MEDICINE

## 2020-12-02 RX ORDER — LORATADINE 10 MG/1
10 TABLET ORAL DAILY
Qty: 30 TAB | Refills: 0 | Status: SHIPPED | OUTPATIENT
Start: 2020-12-02

## 2020-12-02 RX ORDER — DOXYCYCLINE 100 MG/1
100 TABLET ORAL 2 TIMES DAILY
Qty: 20 TAB | Refills: 0 | Status: SHIPPED | OUTPATIENT
Start: 2020-12-02 | End: 2020-12-12

## 2021-07-07 ENCOUNTER — VIRTUAL VISIT (OUTPATIENT)
Dept: FAMILY MEDICINE CLINIC | Age: 61
End: 2021-07-07

## 2021-07-07 DIAGNOSIS — Z91.199 NO-SHOW FOR APPOINTMENT: Primary | ICD-10-CM
